# Patient Record
Sex: FEMALE | Race: WHITE | NOT HISPANIC OR LATINO | Employment: OTHER | URBAN - METROPOLITAN AREA
[De-identification: names, ages, dates, MRNs, and addresses within clinical notes are randomized per-mention and may not be internally consistent; named-entity substitution may affect disease eponyms.]

---

## 2022-12-12 RX ORDER — ATORVASTATIN CALCIUM 80 MG/1
80 TABLET, FILM COATED ORAL DAILY
COMMUNITY

## 2022-12-12 RX ORDER — TRIAMTERENE AND HYDROCHLOROTHIAZIDE 75; 50 MG/1; MG/1
1 TABLET ORAL 2 TIMES DAILY
COMMUNITY
Start: 2022-11-22 | End: 2023-11-22

## 2022-12-12 RX ORDER — PSYLLIUM HUSK 3.4 G/5.8G
1 POWDER ORAL DAILY
COMMUNITY

## 2022-12-12 RX ORDER — CYCLOBENZAPRINE HCL 5 MG
5 TABLET ORAL 3 TIMES DAILY PRN
COMMUNITY
Start: 2022-10-07

## 2022-12-12 RX ORDER — LORATADINE 10 MG/1
10 TABLET ORAL DAILY
COMMUNITY
Start: 2022-04-27 | End: 2023-07-20

## 2022-12-12 RX ORDER — CLOTRIMAZOLE 1 %
CREAM (GRAM) TOPICAL
COMMUNITY
Start: 2022-12-06

## 2022-12-12 RX ORDER — IRBESARTAN 75 MG/1
75 TABLET ORAL NIGHTLY
COMMUNITY

## 2022-12-12 RX ORDER — HYDROCORTISONE ACETATE 25 MG/1
25 SUPPOSITORY RECTAL
COMMUNITY

## 2022-12-12 RX ORDER — BUPROPION HYDROCHLORIDE 150 MG/1
150 TABLET ORAL DAILY
COMMUNITY
Start: 2022-06-07

## 2022-12-12 RX ORDER — SEMAGLUTIDE 1.34 MG/ML
0.25 INJECTION, SOLUTION SUBCUTANEOUS
COMMUNITY
Start: 2022-08-09

## 2022-12-12 RX ORDER — ONDANSETRON 4 MG/1
4 TABLET, ORALLY DISINTEGRATING ORAL EVERY 8 HOURS PRN
COMMUNITY
Start: 2022-05-04

## 2022-12-12 RX ORDER — FAMOTIDINE 40 MG/1
40 TABLET, FILM COATED ORAL DAILY
COMMUNITY

## 2022-12-12 RX ORDER — METOPROLOL SUCCINATE 50 MG/1
50 TABLET, EXTENDED RELEASE ORAL DAILY
COMMUNITY
Start: 2022-06-07

## 2022-12-12 RX ORDER — HYOSCYAMINE SULFATE 0.12 MG/1
0.12 TABLET SUBLINGUAL
COMMUNITY
Start: 2022-06-14 | End: 2023-07-20

## 2022-12-12 RX ORDER — ASPIRIN 81 MG/1
81 TABLET ORAL DAILY
COMMUNITY

## 2022-12-12 RX ORDER — PANTOPRAZOLE SODIUM 40 MG/1
80 TABLET, DELAYED RELEASE ORAL DAILY
COMMUNITY
Start: 2022-06-01

## 2022-12-12 RX ORDER — SPIRONOLACTONE 25 MG/1
25 TABLET ORAL DAILY
COMMUNITY
Start: 2022-06-07 | End: 2023-01-17

## 2023-01-12 DIAGNOSIS — N18.31 STAGE 3A CHRONIC KIDNEY DISEASE: Primary | ICD-10-CM

## 2023-01-17 ENCOUNTER — OFFICE VISIT (OUTPATIENT)
Dept: NEPHROLOGY | Facility: CLINIC | Age: 83
End: 2023-01-17
Payer: MEDICARE

## 2023-01-17 VITALS
BODY MASS INDEX: 34.83 KG/M2 | OXYGEN SATURATION: 99 % | WEIGHT: 204 LBS | SYSTOLIC BLOOD PRESSURE: 116 MMHG | HEIGHT: 64 IN | HEART RATE: 72 BPM | DIASTOLIC BLOOD PRESSURE: 58 MMHG

## 2023-01-17 DIAGNOSIS — N18.32 STAGE 3B CHRONIC KIDNEY DISEASE: Primary | ICD-10-CM

## 2023-01-17 PROCEDURE — 99213 OFFICE O/P EST LOW 20 MIN: CPT | Mod: S$GLB,,, | Performed by: INTERNAL MEDICINE

## 2023-01-17 PROCEDURE — 99213 PR OFFICE/OUTPT VISIT, EST, LEVL III, 20-29 MIN: ICD-10-PCS | Mod: S$GLB,,, | Performed by: INTERNAL MEDICINE

## 2023-01-17 RX ORDER — SUCRALFATE 1 G/1
1 TABLET ORAL
COMMUNITY
Start: 2022-06-15

## 2023-01-17 RX ORDER — DOCUSATE SODIUM 100 MG/1
100 CAPSULE, LIQUID FILLED ORAL 2 TIMES DAILY
COMMUNITY

## 2023-01-17 RX ORDER — MAGNESIUM CARB/ALUMINUM HYDROX 105-160MG
TABLET,CHEWABLE ORAL
COMMUNITY
Start: 2022-07-06

## 2023-01-17 RX ORDER — SILVER SULFADIAZINE 10 G/1000G
CREAM TOPICAL
COMMUNITY

## 2023-01-17 NOTE — PROGRESS NOTES
Nephrology Clinic Note           Pt Name:  Shanika Kathleen  Pt :  1940  Pt MRN:  [id number]    Date: 2023  Provider: Jack Mayes      Chief Complaint: No chief complaint on file.      HPI:  Shanika Kathleen is a 82 y.o. female presenting for chronic kidney disease stage III.  Has hypertension, diabetes type 2, arthritis,s/p covid 19 infection  The patient is coming here for the 5th time. The initial referral from her primary doctor for abnormal renal function .  The patient denies any prior history for kidney problems.  She admits from chronic usage of NSAIDs because of pain in her left foot and arthritis.    Since her last visit no major medical issues - was recently diagnosed with afib and started on xarelto.  Today she denies any chest pain, no shortness of breath, no diarrhea, no fever, no dysuria or hematuria, no swelling of her legs.  She used to be on p.o. medication for her diabetes but Metformin was stopped because of the good control of her diabetes.  Denies any family history for kidney problems.  Used to be on NSAIDs.           Review of Systems   Gen: no fever, chills, fatigue, weight loss/gain  HEENT: no vision changes, no hearing deficits, no nasal discharge  Respiratory: no cough, dyspnea  CVS: no chest pain, PND, orthopnea  Abd: no nausea, vomiting, abdominal pain, diarrhea, constipation  : no hematuria, dysuria, urinary retention  Ext: no edema, joint and muscle pains  Neuro: no weakness, no numbness  Skin: no rashes, no lesions  Psych: no depression, no anxiety    History:   Past Medical History:   Diagnosis Date    Anxiety disorder, unspecified     Clostridioides difficile infection     Clotting disorder     Coronary artery disease     Dementia     Diabetes mellitus     Diverticulitis     Essential (primary) hypertension     GERD (gastroesophageal reflux disease)     Hard of hearing     Hiatal hernia     Kidney stone     Mixed hyperlipidemia     Myopia, bilateral      Neuropathy     Personal history of colonic polyps     PUD (peptic ulcer disease)     Recurrent UTI     Rheumatoid arthritis, unspecified     Unspecified macular degeneration      Past Surgical History:   Procedure Laterality Date    BREAST LUMPECTOMY Left 2012    pre-cancerous    CATARACT EXTRACTION W/ INTRAOCULAR LENS  IMPLANT, BILATERAL      CHOLECYSTECTOMY      COLECTOMY      ruptured diverticulum    COLONOSCOPY  2015    CORONARY ANGIOPLASTY WITH STENT PLACEMENT      ESOPHAGOGASTRODUODENOSCOPY      HYSTERECTOMY      OOPHORECTOMY      REPLACEMENT-KNEE-TOTAL-SIGHT ASSISTED Right     URETHRAL FISTULA REPAIR      from ruptured diverticulum     Family History   Problem Relation Age of Onset    Cancer Mother         Breast    Heart disease Father     Diabetes Father      Social History     Substance and Sexual Activity   Alcohol Use Not Currently     Social History     Substance and Sexual Activity   Drug Use Not Currently     Social History     Substance and Sexual Activity   Sexual Activity Not Currently     reports that she is not currently sexually active.  Social History     Tobacco Use   Smoking Status Former    Packs/day: 1.00    Years: 25.00    Pack years: 25.00    Types: Cigarettes    Quit date:     Years since quittin.0   Smokeless Tobacco Not on file       Allergies:  Review of patient's allergies indicates:   Allergen Reactions    Monosodium glutamate     Furosemide Nausea Only       [unfilled]       Physical Exam  Vitals:   There were no vitals filed for this visit.  There is no height or weight on file to calculate BMI.    Vital signs:   Wt Readings from Last 3 Encounters:   No data found for Wt     Temp Readings from Last 3 Encounters:   No data found for Temp     BP Readings from Last 3 Encounters:   No data found for BP     Pulse Readings from Last 3 Encounters:   No data found for Pulse       Physical Exam    GENERAL ASSESSMENT: awake and alert in no acute distress.  Eyes: anicteric  "sclera, no erythema or discharge.  HENT: Normocephalic, atraumatic, moist mucus membranes  Neck: Supple, no thyromegaly or lymphadenopathy   SKIN EXAM: no rash, ecchymosis.  Cardiovascular: regular rate and rhythm, S1 S2 heard.  No murmurs, rubs or gallops.  Respiratory: No rales, no wheezes or rhonchi  GI: BS+, NT, ND, no organomegaly.  : No Fitzpatrick   MSK: No edema.  No joint stiffness, effusions  NEURO: alert and oriented,  PSYCH: answers questions appropriately, organized thinking   Nails: No clubbing, no pallor         Labs/Tests:  Lab Results   Component Value Date     11/21/2022    K 4.7 11/21/2022     11/21/2022    CO2 26 11/21/2022    BUN 33 (H) 11/21/2022    CREATININE 1.50 (H) 11/21/2022    CREATININE 1.41 (H) 08/09/2022    CREATININE 1.23 (H) 06/08/2022    GLUCOSE Negative 01/13/2023    CALCIUM 9.8 11/21/2022    PHOSPHORUS 4.3 01/14/2022    ALBUMIN 4.4 11/21/2022    EGFRNONAA 33 (L) 11/21/2022    EGFRNONAA 35 (L) 08/09/2022    EGFRNONAA 41 (L) 06/08/2022    ESTGFRAFRICA 37 (L) 11/21/2022    ESTGFRAFRICA 40 (L) 08/09/2022    ESTGFRAFRICA 48 (L) 06/08/2022     (H) 01/13/2023    HGB 13.5 01/13/2023    HGB 13.9 06/08/2022    HGB 14.3 04/02/2022    HGBA1C 6.4 (H) 12/13/2022    IRON 86 06/08/2022    TIBC 283 06/08/2022    FERRITIN 34.9 01/15/2021    TRIG 100 12/13/2022    CHOL 127 12/13/2022    HDL 47 12/13/2022    LDLCALC 60 12/13/2022    LABVLDL 20 12/13/2022    [id number] 47.3 06/08/2022                               Renal US - Renal US has ultrasound done in August 2019-It reports atrophic kidneys right one - 7.2 cm and also it reports 10.3 cm again "right" kidney. ( most likely meant left kidney 10/3 cm). Most likely atrophic is right kidney and left kidney is with good size, she has simple cyst 15 mm left kidney  Impression:    Assessment & Plan:        Visit Diagnosis  No diagnosis found.      Plan    Hypertension- well control at present- encourage her to check it at home and to " report to us, continue with the current management she is on  metoprolol 50 mg spironolactone 25 mg irbesaratn 150  Mg.  Talk to her extensively about low-salt diet.  Wants her cardiologist to manage her BP because the BP readings go to his office  Diabetes type 2-currently A1c 6.8 - 7.0, she is not on any medication present. On ozempic   Chronic kidney disease stage III - currently with raphael - most likely hemodynamically - 0.9 to 1.0, talk to her extensively about the risk factors that can cause more kidney damage.  Today she looks euvolemic, electrolytes noted, no need for additional medication at present.  We will continue to follow-up her renal function in 6 months.   NSAID usage -not taking them anymore.  Proteinuria-no proteinuria.  Secondary hyperparathyroidism-pth noted.  Afib - recently diagnosed - on xarelto - talked to her that there is association between afib and ckd, and that could a attributing factor to her recent worsening of the kidney function      Orders this visit   No orders of the defined types were placed in this encounter.         Follow Up:   No follow-ups on file.    Jack Mayes M.D.  Nephrology  01/17/2023  3:22 PM

## 2023-07-20 ENCOUNTER — OFFICE VISIT (OUTPATIENT)
Dept: NEPHROLOGY | Facility: CLINIC | Age: 83
End: 2023-07-20
Payer: MEDICARE

## 2023-07-20 VITALS
WEIGHT: 202 LBS | DIASTOLIC BLOOD PRESSURE: 67 MMHG | BODY MASS INDEX: 34.49 KG/M2 | HEART RATE: 71 BPM | SYSTOLIC BLOOD PRESSURE: 116 MMHG | HEIGHT: 64 IN | OXYGEN SATURATION: 97 %

## 2023-07-20 DIAGNOSIS — N18.31 STAGE 3A CHRONIC KIDNEY DISEASE: Primary | ICD-10-CM

## 2023-07-20 PROCEDURE — 99213 OFFICE O/P EST LOW 20 MIN: CPT | Mod: S$GLB,,, | Performed by: INTERNAL MEDICINE

## 2023-07-20 PROCEDURE — 99213 PR OFFICE/OUTPT VISIT, EST, LEVL III, 20-29 MIN: ICD-10-PCS | Mod: S$GLB,,, | Performed by: INTERNAL MEDICINE

## 2023-07-20 RX ORDER — QUETIAPINE FUMARATE 25 MG/1
25 TABLET, FILM COATED ORAL NIGHTLY
COMMUNITY
Start: 2023-06-07

## 2023-07-20 RX ORDER — APIXABAN 2.5 MG/1
TABLET, FILM COATED ORAL 2 TIMES DAILY
COMMUNITY
Start: 2023-05-31

## 2023-07-20 RX ORDER — SPIRONOLACTONE 25 MG/1
25 TABLET ORAL DAILY
COMMUNITY
Start: 2023-06-01

## 2023-07-20 RX ORDER — MECLIZINE HYDROCHLORIDE 25 MG/1
25 TABLET ORAL
COMMUNITY
Start: 2023-05-01

## 2023-07-20 NOTE — PROGRESS NOTES
Nephrology Clinic Note           Pt Name:  Shanika Kathleen  Pt :  1940  Pt MRN:  [id number]    Date: 2023  Provider: Jack Mayes      Chief Complaint: No chief complaint on file.      HPI:  Shanika Kathleen is a 83 y.o. female presenting for chronic kidney disease stage III.  Has hypertension, diabetes type 2, arthritis,s/p covid 19 infection  The patient is coming here for the 6th time. The initial referral from her primary doctor for abnormal renal function .  The patient denies any prior history for kidney problems.  She admits from chronic usage of NSAIDs because of pain in her left foot and arthritis.    Since her last visit no major medical issues. BS slightly higher  Today she denies any chest pain, no shortness of breath, no diarrhea, no fever, no dysuria or hematuria, no swelling of her legs.  She used to be on p.o. medication for her diabetes but Metformin was stopped because of the good control of her diabetes.  Denies any family history for kidney problems.  Used to be on NSAIDs.           Review of Systems   Gen: no fever, chills, fatigue, weight loss/gain  HEENT: no vision changes, no hearing deficits, no nasal discharge  Respiratory: no cough, dyspnea  CVS: no chest pain, PND, orthopnea  Abd: no nausea, vomiting, abdominal pain, diarrhea, constipation  : no hematuria, dysuria, urinary retention  Ext: no edema, joint and muscle pains  Neuro: no weakness, no numbness  Skin: no rashes, no lesions  Psych: no depression, no anxiety    History:   Past Medical History:   Diagnosis Date    Anxiety disorder, unspecified     Clostridioides difficile infection     Clotting disorder     Coronary artery disease     Dementia     Diabetes mellitus     Diverticulitis     Essential (primary) hypertension     GERD (gastroesophageal reflux disease)     Hard of hearing     Hiatal hernia     Kidney stone     Mixed hyperlipidemia     Myopia, bilateral     Neuropathy     Personal history of  colonic polyps     PUD (peptic ulcer disease)     Recurrent UTI     Rheumatoid arthritis, unspecified     Unspecified macular degeneration      Past Surgical History:   Procedure Laterality Date    BREAST LUMPECTOMY Left     pre-cancerous    CATARACT EXTRACTION W/ INTRAOCULAR LENS  IMPLANT, BILATERAL      CHOLECYSTECTOMY      COLECTOMY      ruptured diverticulum    COLONOSCOPY  2015    CORONARY ANGIOPLASTY WITH STENT PLACEMENT      ESOPHAGOGASTRODUODENOSCOPY      HYSTERECTOMY      OOPHORECTOMY      REPLACEMENT-KNEE-TOTAL-SIGHT ASSISTED Right     URETHRAL FISTULA REPAIR      from ruptured diverticulum     Family History   Problem Relation Age of Onset    Cancer Mother         Breast    Heart disease Father     Diabetes Father      Social History     Substance and Sexual Activity   Alcohol Use Not Currently     Social History     Substance and Sexual Activity   Drug Use Not Currently     Social History     Substance and Sexual Activity   Sexual Activity Not Currently     reports that she is not currently sexually active.  Social History     Tobacco Use   Smoking Status Former    Packs/day: 1.00    Years: 25.00    Pack years: 25.00    Types: Cigarettes    Quit date:     Years since quittin.5   Smokeless Tobacco Not on file       Allergies:  Review of patient's allergies indicates:   Allergen Reactions    Lisinopril      Other reaction(s): Cough    Monosodium glutamate     Nitrofurantoin monohyd/m-cryst      Other reaction(s): unknown    Furosemide Nausea Only       [unfilled]       Physical Exam  Vitals:   There were no vitals filed for this visit.  There is no height or weight on file to calculate BMI.    Vital signs:   Wt Readings from Last 3 Encounters:   23 92.5 kg (204 lb)     Temp Readings from Last 3 Encounters:   No data found for Temp     BP Readings from Last 3 Encounters:   23 (!) 116/58     Pulse Readings from Last 3 Encounters:   23 72       Physical Exam    GENERAL  "ASSESSMENT: awake and alert in no acute distress.  Eyes: anicteric sclera, no erythema or discharge.  HENT: Normocephalic, atraumatic, moist mucus membranes  Neck: Supple, no thyromegaly or lymphadenopathy   SKIN EXAM: no rash, ecchymosis.  Cardiovascular: regular rate and rhythm, S1 S2 heard.  No murmurs, rubs or gallops.  Respiratory: No rales, no wheezes or rhonchi  GI: BS+, NT, ND, no organomegaly.  : No Fitzpatrick   MSK: No edema.  No joint stiffness, effusions  NEURO: alert and oriented,  PSYCH: answers questions appropriately, organized thinking   Nails: No clubbing, no pallor         Labs/Tests:  Lab Results   Component Value Date     11/21/2022    K 4.7 11/21/2022     11/21/2022    CO2 26 11/21/2022    BUN 33 (H) 11/21/2022    CREATININE 1.50 (H) 11/21/2022    CREATININE 1.41 (H) 08/09/2022    CREATININE 1.23 (H) 06/08/2022    GLUCOSE Negative 07/14/2023    CALCIUM 9.8 11/21/2022    PHOSPHORUS 4.3 01/14/2022    ALBUMIN 4.4 11/21/2022    EGFRNONAA 33 (L) 11/21/2022    EGFRNONAA 35 (L) 08/09/2022    EGFRNONAA 41 (L) 06/08/2022    ESTGFRAFRICA 37 (L) 11/21/2022    ESTGFRAFRICA 40 (L) 08/09/2022    ESTGFRAFRICA 48 (L) 06/08/2022    PTH 89 (H) 07/14/2023    HGB 13.5 01/13/2023    HGB 13.9 06/08/2022    HGB 14.3 04/02/2022    HGBA1C 7.3 (H) 07/14/2023    IRON 86 06/08/2022    TIBC 283 06/08/2022    FERRITIN 34.9 01/15/2021    TRIG 111 05/10/2023    CHOL 119 05/10/2023    HDL 46 05/10/2023    LDLCALC 51 05/10/2023    LABVLDL 22 05/10/2023    [id number] 47.3 06/08/2022                                               Renal US - Renal US has ultrasound done in August 2019-It reports atrophic kidneys right one - 7.2 cm and also it reports 10.3 cm again "right" kidney. ( most likely meant left kidney 10/3 cm). Most likely atrophic is right kidney and left kidney is with good size, she has simple cyst 15 mm left kidney  Impression:    Assessment & Plan:        Visit Diagnosis  No diagnosis " found.      Plan    Hypertension- well control at present- encourage her to check it at home and to report to us, continue with the current management she is on  metoprolol 50 mg spironolactone 25 mg irbesaratn 150  Mg.  Talk to her extensively about low-salt diet.  Wants her cardiologist to manage her BP because the BP readings go to his office  Diabetes type 2-currently A1c 6.8 - 7.0, she is not on any medication present. On ozempic, will prescribe Jardiance - side effects discussed with the patient the need to keep with the water intake discussed with the patient   Chronic kidney disease stage III - currently with raphael - most likely hemodynamically - 0.9 to 1.2.  talk to her extensively about the risk factors that can cause more kidney damage.  Today she looks euvolemic, electrolytes noted, no need for additional medication at present.  We will continue to follow-up her renal function in 6 months.   NSAID usage -not taking them anymore. To be started on jardiance.  Proteinuria-no proteinuria.  Secondary hyperparathyroidism-pth noted.  Afib - recently diagnosed - on xarelto       Orders this visit   No orders of the defined types were placed in this encounter.         Follow Up:   No follow-ups on file.    Jack Mayes M.D.  Nephrology  07/20/2023  3:22 PM

## 2023-10-23 RX ORDER — EMPAGLIFLOZIN 10 MG/1
10 TABLET, FILM COATED ORAL
Qty: 90 TABLET | Refills: 3 | Status: SHIPPED | OUTPATIENT
Start: 2023-10-23